# Patient Record
Sex: FEMALE | Race: WHITE
[De-identification: names, ages, dates, MRNs, and addresses within clinical notes are randomized per-mention and may not be internally consistent; named-entity substitution may affect disease eponyms.]

---

## 2017-03-29 ENCOUNTER — HOSPITAL ENCOUNTER (OUTPATIENT)
Dept: HOSPITAL 53 - M LAB REF | Age: 52
End: 2017-03-29
Attending: INTERNAL MEDICINE
Payer: COMMERCIAL

## 2017-03-29 DIAGNOSIS — R16.1: Primary | ICD-10-CM

## 2017-03-29 LAB
FERRITIN SERPL-MCNC: 16 NG/ML (ref 8–252)
IRON SATN MFR SERPL: 9.3 % (ref 13.2–37.4)
PROT SERPL-MCNC: 7 GM/DL (ref 6.4–8.2)
RETIC HEMOGLOBIN CONTENT CHR: 28.7 PG (ref 24–36)
RETICS/RBC NFR AUTO: 97 X10(9)/L (ref 17–77)
RETICS/RBC NFR: 2.3 % (ref 0.5–1.5)
TIBC SERPL-MCNC: 408 UG/DL (ref 250–450)

## 2017-03-30 LAB
ALBUMIN MFR UR ELPH: 55.1 % (ref 55.8–66.1)
ALBUMIN SERPL-MCNC: 3.86 GM/DL (ref 3.29–5.55)
GAMMA GLOB 24H MFR UR ELPH: 15.6 % (ref 11.1–18.8)

## 2017-03-31 LAB — HBV SURFACE AB SER QL: NEGATIVE

## 2017-04-19 ENCOUNTER — HOSPITAL ENCOUNTER (OUTPATIENT)
Dept: HOSPITAL 53 - M RAD | Age: 52
End: 2017-04-19
Attending: INTERNAL MEDICINE
Payer: COMMERCIAL

## 2017-04-19 DIAGNOSIS — C73: Primary | ICD-10-CM

## 2017-04-19 NOTE — REP
PET/CT:

 

History: Initial staging thyroid carcinoma.

 

Comparisons: Thyroid ultrasound [facility] 12/19/2016.

 

TECHNIQUE:

 

46 minutes following the intravenous injection of a 9.9 mCi dose of F-18 FDG,

three-dimensional PET scintigraphy is acquired from the skull base to the

proximal thighs. Triplanar noncontrast CT scanning is acquired through the same

anatomic range for attenuation correction, and image registration with scan

parameters optimized to minimize radiation exposure to the patient. PET

scintigraphy and CT datasets were fused and displayed on a workstation with

multiplanar and projection display capability.

 

PET/CT Findings: There is hypermetabolic uptake to the right of midline in the

thyroid gland in a 1 cm nodule.  Maximum standard uptake value in this nodule is

15.2.  It appears to be in the lower pole of the right lobe of the thyroid.  No

other abnormal hypermetabolic uptake is seen within the head and neck soft

tissues.

 

No abnormal hypermetabolic uptake is seen in the mediastinum.  No abnormal

pulmonary parenchymal uptake is seen.  The abdomen and pelvis, there is normal

hepatic, splenic, gastrointestinal, and genitourinary FDG distribution.  No

abnormal uptake seen in the abdomen or pelvis.  No abnormal skeletal uptake is

observed.

 

Impression:

 

Small markedly hypermetabolic right inferior pole thyroid nodule compatible with

a thyroid malignancy.  No other abnormal hypermetabolic uptake is seen.

 

 

Signed by

Dhaval Steward MD 04/20/2017 12:37 P

## 2017-07-24 ENCOUNTER — HOSPITAL ENCOUNTER (OUTPATIENT)
Dept: HOSPITAL 53 - M RAD | Age: 52
End: 2017-07-24
Attending: INTERNAL MEDICINE
Payer: COMMERCIAL

## 2017-07-24 DIAGNOSIS — R16.1: Primary | ICD-10-CM

## 2017-07-24 DIAGNOSIS — C73: ICD-10-CM

## 2017-07-25 NOTE — REP
Clinical:  Thyroid cancer.

 

Technique:  Axial contrast enhanced images from the thoracic inlet to the pubic

symphysis using oral and 100 ml Isovue 370 intravenous contrast material with

precontrast and delayed images of the abdomen as well as coronal and sagittal

re-formations.

 

Comparison:  07/24/2014.

 

Findings:

Lung bases are clear.  Visualized heart and pericardium normal.

 

Liver, pancreas, gallbladder, bilateral adrenal glands and kidneys are relatively

normal.  The spleen and demonstrates a stable heterogeneous enhancing mass

extending from the lower pole measuring 7.5 cm diameter likely representing

splenic lymphangioma/hemangioma complex.  The enteric system is without

obstruction or acute inflammatory process.  Pelvis demonstrates normal bladder

and age-appropriate uterus/adnexa.  No pelvic fluid or ascites.  No free air.  No

adenopathy.  No abdominal pelvic mass lesion.  Stable fat-containing ventral

hernia measures approximately 6.6 cm maximal diameter with orifice through the

rectus sheath measuring 4.5 cm.  Abdominal aorta and vasculature normal.

Musculoskeletal structures without focal osseous abnormality.

 

Impression:

1.  Stable heterogeneous enhancing splenic mass unchanged from 2014 and most

compatible with lymphangioma/hemangioma.

2.  6.6 cm fat containing ventral hernia.

3.  No further acute abdominopelvic pathology appreciated.

 

 

 

 

Signed by

Chapito Hernandez MD 07/25/2017 05:54 A

## 2017-07-25 NOTE — REP
Clinical:  Thyroid cancer.

 

Technique:  Axial contrast enhanced images from the thoracic inlet to the pubic

symphysis using oral and 100 ml Isovue 370 intravenous contrast material with

coronal and sagittal re-formations.

 

Comparison:  02/09/2015.

 

Findings:

The bilateral lung fields are well-aerated, symmetric, and clear.  No pulmonary

parenchymal consolidation, soft tissue nodule or mass lesion appreciated.  No

pleural effusion/reaction or pneumothorax.  Tracheobronchial tree is patent.  No

axillary, hilar, or mediastinal adenopathy.  The mediastinum demonstrates normal

thoracic aorta and heart/pericardium.  Surrounding musculoskeletal structures

demonstrate age-related changes without focal osseous abnormality.

 

Limited evaluation of the upper abdomen demonstrates normal bilateral adrenal

glands.  A heterogeneous mass-like appearance involves the visualized lower

portion of the spleen and is incompletely evaluated.

 

Impression:

1.  No acute mediastinal or pleuroparenchymal process.

2.  Heterogeneous mass-like appearance to the visualized lower portion of the

spleen which is incompletely evaluated and requires correlation.  Differential

diagnosis may include splenic lymphangioma/hemangioma.

 

 

Signed by

Chapito Hernandez MD 07/25/2017 05:49 A

## 2021-04-01 ENCOUNTER — HOSPITAL ENCOUNTER (OUTPATIENT)
Dept: HOSPITAL 53 - M RAD | Age: 56
End: 2021-04-01
Attending: INTERNAL MEDICINE
Payer: COMMERCIAL

## 2021-04-01 DIAGNOSIS — K76.0: Primary | ICD-10-CM

## 2021-04-01 DIAGNOSIS — D73.89: ICD-10-CM

## 2021-04-01 DIAGNOSIS — R16.1: ICD-10-CM

## 2021-04-01 NOTE — REP
INDICATION:

SPLENOMEGALY



TECHNIQUE:

Real time B-mode gray scale ultrasound examination using curved array transducer.



FINDINGS:

Liver is increased in echotexture suggesting fatty infiltration without focal hepatic

lesion identified.  Area of fatty sparing adjacent to the gallbladder fossa noted.

The main portal vein is minimally dilated at 15 mm diameter.



Visualized portions of the pancreas are unremarkable.



The gallbladder is normal and without gallstones, wall thickening, or pericholecystic

fluid.  No biliary ductal dilatation is appreciated and the common bile duct measures

approximately 6 mm.



The spleen is heterogeneous and enlarged measuring 18.9 x 5.9 x 16.7 cm and a central

mass lesion measuring 10.5 x 7.8 x 9.5 cm cannot be excluded.



Bilateral kidneys are normal in reniform shape without hydronephrosis.  Left kidney

measures 12.7 x 5.1 x 6.4 cm.  Right kidney measures 12.9 x 5.2 x 5.0 cm.



No ascites in the visualized abdomen.

Visualized abdominal aorta appears normal.



IMPRESSION:

1. Hepatosteatosis.

2. New splenomegaly with chronic splenic mass lesion identified on prior CT dated 2017.





<Electronically signed by Chapito Hernandez > 04/01/21 [id number]

## 2021-06-07 ENCOUNTER — HOSPITAL ENCOUNTER (OUTPATIENT)
Dept: HOSPITAL 53 - M PLARAD | Age: 56
End: 2021-06-07
Attending: INTERNAL MEDICINE
Payer: COMMERCIAL

## 2021-06-07 DIAGNOSIS — C73: Primary | ICD-10-CM

## 2021-06-07 PROCEDURE — 78815 PET IMAGE W/CT SKULL-THIGH: CPT

## 2021-06-08 NOTE — REP
INDICATION:

RESTAGING THYROID CANCER. Patient is status post thyroidectomy.



COMPARISON:

04/19/2017 the latest prior



TECHNIQUE:

After the intravenous administration of 9.86 mCi of FDG 18 triplane whole-body PET-CT

was performed from the skull base to the mid thigh.



FINDINGS:

There is no abnormal hypermetabolic activity seen in the neck, chest, abdomen, or

pelvis.  Multifocal hypermetabolic sites are again seen in the vertebral bodies status

quo.  These areas are just hypermetabolic with a maximal SUV value of 2.6 to 2.7.



The nondiagnostic low-dose CT portion of today's exam again shows splenomegaly which

is well-known and has been previously investigated.



IMPRESSION:

There is no new abnormal hypermetabolic activity.  Findings as described above.





<Electronically signed by Harmeet Patterson > 06/08/21 [id number]

## 2022-03-18 ENCOUNTER — HOSPITAL ENCOUNTER (OUTPATIENT)
Dept: HOSPITAL 53 - M PLALAB | Age: 57
End: 2022-03-18
Attending: INTERNAL MEDICINE
Payer: COMMERCIAL

## 2022-03-18 DIAGNOSIS — E89.0: Primary | ICD-10-CM

## 2022-03-18 DIAGNOSIS — Z85.850: ICD-10-CM

## 2022-03-18 LAB
T4 FREE SERPL-MCNC: 0.97 NG/DL (ref 0.76–1.46)
TSH SERPL DL<=0.005 MIU/L-ACNC: 11.7 UIU/ML (ref 0.36–3.74)

## 2022-05-18 ENCOUNTER — HOSPITAL ENCOUNTER (OUTPATIENT)
Dept: HOSPITAL 53 - M RAD | Age: 57
End: 2022-05-18
Attending: NURSE PRACTITIONER
Payer: COMMERCIAL

## 2022-05-18 DIAGNOSIS — D17.71: Primary | ICD-10-CM

## 2022-05-18 LAB
BUN SERPL-MCNC: 15 MG/DL (ref 7–18)
CALCIUM SERPL-MCNC: 9.7 MG/DL (ref 8.5–10.1)
CHLORIDE SERPL-SCNC: 105 MEQ/L (ref 98–107)
CO2 SERPL-SCNC: 33 MEQ/L (ref 21–32)
CREAT SERPL-MCNC: 0.65 MG/DL (ref 0.55–1.3)
GFR SERPL CREATININE-BSD FRML MDRD: > 60 ML/MIN/{1.73_M2} (ref 51–?)
GLUCOSE SERPL-MCNC: 150 MG/DL (ref 70–100)
POTASSIUM SERPL-SCNC: 4.2 MEQ/L (ref 3.5–5.1)
SODIUM SERPL-SCNC: 143 MEQ/L (ref 136–145)

## 2022-05-23 ENCOUNTER — HOSPITAL ENCOUNTER (OUTPATIENT)
Dept: HOSPITAL 53 - M RAD | Age: 57
End: 2022-05-23
Attending: NURSE PRACTITIONER
Payer: COMMERCIAL

## 2022-05-23 DIAGNOSIS — D73.9: ICD-10-CM

## 2022-05-23 DIAGNOSIS — D17.71: Primary | ICD-10-CM

## 2022-05-23 DIAGNOSIS — K42.9: ICD-10-CM

## 2022-05-23 PROCEDURE — 74170 CT ABD WO CNTRST FLWD CNTRST: CPT

## 2022-09-20 ENCOUNTER — HOSPITAL ENCOUNTER (OUTPATIENT)
Dept: HOSPITAL 53 - M LAB REF | Age: 57
End: 2022-09-20
Attending: NURSE PRACTITIONER
Payer: COMMERCIAL

## 2022-09-20 DIAGNOSIS — E11.65: Primary | ICD-10-CM

## 2022-09-20 LAB
CREAT UR-MCNC: 225 MG/DL
MICROALBUMIN UR-MCNC: 20.6 MG/L
MICROALBUMIN/CREAT UR: 9.1 MCG/MG (ref 0–30)

## 2023-04-13 ENCOUNTER — HOSPITAL ENCOUNTER (OUTPATIENT)
Dept: HOSPITAL 53 - M PLALAB | Age: 58
End: 2023-04-13
Attending: NURSE PRACTITIONER
Payer: COMMERCIAL

## 2023-04-13 DIAGNOSIS — Z85.850: ICD-10-CM

## 2023-04-13 DIAGNOSIS — E89.0: Primary | ICD-10-CM

## 2023-04-13 LAB
T4 FREE SERPL-MCNC: 0.99 NG/DL (ref 0.89–1.76)
TSH SERPL DL<=0.005 MIU/L-ACNC: 5.63 UIU/ML (ref 0.55–4.78)